# Patient Record
Sex: FEMALE | Race: WHITE | Employment: PART TIME | URBAN - METROPOLITAN AREA
[De-identification: names, ages, dates, MRNs, and addresses within clinical notes are randomized per-mention and may not be internally consistent; named-entity substitution may affect disease eponyms.]

---

## 2017-02-13 ENCOUNTER — OFFICE VISIT (OUTPATIENT)
Dept: FAMILY MEDICINE | Facility: CLINIC | Age: 32
End: 2017-02-13

## 2017-02-13 VITALS
TEMPERATURE: 98.4 F | OXYGEN SATURATION: 99 % | DIASTOLIC BLOOD PRESSURE: 60 MMHG | SYSTOLIC BLOOD PRESSURE: 118 MMHG | HEART RATE: 103 BPM | WEIGHT: 159 LBS | BODY MASS INDEX: 25.66 KG/M2

## 2017-02-13 DIAGNOSIS — R07.0 THROAT PAIN: Primary | ICD-10-CM

## 2017-02-13 DIAGNOSIS — J02.9 VIRAL PHARYNGITIS: ICD-10-CM

## 2017-02-13 LAB — S PYO AG THROAT QL IA.RAPID: NORMAL

## 2017-02-13 PROCEDURE — 87430 STREP A AG IA: CPT | Performed by: FAMILY MEDICINE

## 2017-02-13 PROCEDURE — 99212 OFFICE O/P EST SF 10 MIN: CPT | Performed by: FAMILY MEDICINE

## 2017-02-13 NOTE — MR AVS SNAPSHOT
After Visit Summary   2/13/2017    Tala Hurtado    MRN: [id number]           Patient Information     Date Of Birth          1985        Visit Information        Provider Department      2/13/2017 3:15 PM Daniel Watson MD [facility]        Today's Diagnoses     Throat pain    -  1    Viral pharyngitis           Follow-ups after your visit        Who to contact     If you have questions or need follow up information about today's clinic visit or your schedule please contact [facility] directly at [phone].  Normal or non-critical lab and imaging results will be communicated to you by [employer], letter or phone within 4 business days after the clinic has received the results. If you do not hear from us within 7 days, please contact the clinic through [employer] or phone. If you have a critical or abnormal lab result, we will notify you by phone as soon as possible.  Submit refill requests through [employer] or call your pharmacy and they will forward the refill request to us. Please allow 3 business days for your refill to be completed.          Additional Information About Your Visit        MyChart Information     [employer] gives you secure access to your electronic health record. If you see a primary care provider, you can also send messages to your care team and make appointments. If you have questions, please call your primary care clinic.  If you do not have a primary care provider, please call [phone] and they will assist you.        Care EveryWhere ID     This is your Care EveryWhere ID. This could be used by other organizations to access your [place] medical records  [id number]        Your Vitals Were     Pulse Temperature Last Period Pulse Oximetry Breastfeeding? BMI (Body Mass Index)    103 98.4  F (36.9  C) (Oral) 01/29/2017 99% No 25.66 kg/m2       Blood Pressure from Last 3 Encounters:   02/13/17 118/60   09/25/14 102/68   03/20/14 138/80    Weight  from Last 3 Encounters:   02/13/17 72.1 kg (159 lb)   09/25/14 66.6 kg (146 lb 12.8 oz)   03/20/14 64.5 kg (142 lb 3.2 oz)              We Performed the Following     Beta Strep Grp A Cult ([facility])     Rapid Strep ([facility])        Primary Care Provider Office Phone # Fax #    Daniel Watson [phone]       [facility] [address]  [facility] [address]        Thank you!     Thank you for choosing [facility]  for your care. Our goal is always to provide you with excellent care. Hearing back from our patients is one way we can continue to improve our services. Please take a few minutes to complete the written survey that you may receive in the mail after your visit with us. Thank you!             Your Updated Medication List - Protect others around you: Learn how to safely use, store and throw away your medicines at www.disposemymeds.org.          This list is accurate as of: 2/13/17 10:23 PM.  Always use your most recent med list.                   Brand Name Dispense Instructions for use    ADVIL 200 MG capsule   Generic drug:  ibuprofen      Take 200 mg by mouth as needed.       B-12 PO          LANTUS SC      Inject  Subcutaneous daily. 8 units every am       MICROGESTIN 1.5-30 MG-MCG per tablet   Generic drug:  norethindrone-ethinyl estradiol      Take 1 tablet by mouth daily.       NOVOLIN L SC      Inject Subcutaneous as needed Reported on 2/13/2017       propranolol 10 MG tablet    INDERAL     Take  by mouth as needed.       STATIN NOT PRESCRIBED (INTENTIONAL)     0 each    Statin not prescribed intentionally due to does not meet [place] criteria       VALTREX 500 MG tablet   Generic drug:  valACYclovir      Take 500 mg by mouth daily.

## 2017-02-14 NOTE — PROGRESS NOTES
"SUBJECTIVE:   Tala Hurtado  is a 31 year old  year old male presenting with a complaint of Sore throat.  Symptoms had a sudden onset  3 days ago .  The symptoms are moderate in severity.   Other Current and Associated symptoms: \"cold symptoms\".    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 8 units every am, Disp: , Rfl:      ibuprofen (ADVIL) 200 MG capsule, Take 200 mg by mouth as needed., Disp: , Rfl:      norethindrone-ethinyl estradiol (MICROGESTIN) 1.5-30 MG-MCG TABS, Take 1 tablet by mouth daily., Disp: , Rfl:      ValACYclovir (VALTREX) 500 MG tablet, Take 500 mg by mouth daily., Disp: , Rfl:      Insulin Zinc Human (NOVOLIN L SC), Inject Subcutaneous as needed Reported on 2/13/2017, Disp: , Rfl:     reports that she has never smoked. She has never used smokeless tobacco.      ROS:  C: NEGATIVE for fever, chills  ROS otherwise negative    OBJECTIVE  :/60  Wt 71.215 kg (157 lb)   APPEARANCE: = Nrl  Ears/Nose = Nrl  Ear canals and TM's = Nrl  Oropharynx = Nrl  Neck = Nrl  Resp effort = Nrl  Mood/Affect = Nrl     ASSESSMENT:  Sore Throat  Viral pharyngitis    PLAN:  See Orders in Epic.  The rapid strep test was NEGATIVE.  A back up strep culture is being done.  Symptomatic cares discussed - Gargle, use acetaminophen or other OTC analgesic  Daniel Watson       "

## 2017-02-15 LAB — BETA STREP GP A CULTURE: NEGATIVE

## 2017-02-17 NOTE — PROGRESS NOTES
Dear Tala,   I am writing to report that your included test results are within expected ranges. I do not suggest that we make any changes at this time.    Daniel Watson M.D.

## 2017-03-22 ENCOUNTER — TRANSFERRED RECORDS (OUTPATIENT)
Dept: FAMILY MEDICINE | Facility: CLINIC | Age: 32
End: 2017-03-22

## 2017-03-22 LAB
CHOLEST SERPL-MCNC: 153 MG/DL (ref 125–200)
CREAT SERPL-MCNC: 0.72 MG/DL (ref 0.5–1.1)
GFR SERPL CREATININE-BSD FRML MDRD: 109 ML/MIN/1.73M2
HBA1C MFR BLD: 6.1 % (ref 4–6)
HDLC SERPL-MCNC: 39 MG/DL
LDLC SERPL CALC-MCNC: 91 MG/DL
NONHDLC SERPL-MCNC: 114 MG/DL
TRIGL SERPL-MCNC: 115 MG/DL
TSH SERPL-ACNC: 1.79 UIU/ML (ref 0.3–5)

## 2017-05-08 ENCOUNTER — OFFICE VISIT (OUTPATIENT)
Dept: URGENT CARE | Facility: URGENT CARE | Age: 32
End: 2017-05-08
Payer: COMMERCIAL

## 2017-05-08 VITALS
OXYGEN SATURATION: 98 % | TEMPERATURE: 101 F | BODY MASS INDEX: 26.33 KG/M2 | HEART RATE: 122 BPM | SYSTOLIC BLOOD PRESSURE: 139 MMHG | WEIGHT: 163.1 LBS | DIASTOLIC BLOOD PRESSURE: 89 MMHG

## 2017-05-08 DIAGNOSIS — R52 BODY ACHES: ICD-10-CM

## 2017-05-08 DIAGNOSIS — N39.0 URINARY TRACT INFECTION WITHOUT HEMATURIA, SITE UNSPECIFIED: ICD-10-CM

## 2017-05-08 DIAGNOSIS — R50.9 FEVER AND CHILLS: Primary | ICD-10-CM

## 2017-05-08 DIAGNOSIS — R19.7 DIARRHEA, UNSPECIFIED TYPE: ICD-10-CM

## 2017-05-08 LAB
ALBUMIN UR-MCNC: NEGATIVE MG/DL
APPEARANCE UR: CLEAR
BACTERIA #/AREA URNS HPF: ABNORMAL /HPF
BILIRUB UR QL STRIP: NEGATIVE
COLOR UR AUTO: YELLOW
DEPRECATED S PYO AG THROAT QL EIA: NORMAL
FLUAV+FLUBV AG SPEC QL: NEGATIVE
FLUAV+FLUBV AG SPEC QL: NORMAL
GLUCOSE UR STRIP-MCNC: NEGATIVE MG/DL
HGB UR QL STRIP: NEGATIVE
KETONES UR STRIP-MCNC: NEGATIVE MG/DL
LEUKOCYTE ESTERASE UR QL STRIP: ABNORMAL
MICRO REPORT STATUS: NORMAL
NITRATE UR QL: NEGATIVE
NON-SQ EPI CELLS #/AREA URNS LPF: ABNORMAL /LPF
PH UR STRIP: 6 PH (ref 5–7)
RBC #/AREA URNS AUTO: ABNORMAL /HPF (ref 0–2)
SP GR UR STRIP: 1.02 (ref 1–1.03)
SPECIMEN SOURCE: NORMAL
SPECIMEN SOURCE: NORMAL
URN SPEC COLLECT METH UR: ABNORMAL
UROBILINOGEN UR STRIP-ACNC: 0.2 EU/DL (ref 0.2–1)
WBC #/AREA URNS AUTO: ABNORMAL /HPF (ref 0–2)

## 2017-05-08 PROCEDURE — 87804 INFLUENZA ASSAY W/OPTIC: CPT | Performed by: FAMILY MEDICINE

## 2017-05-08 PROCEDURE — 81001 URINALYSIS AUTO W/SCOPE: CPT | Performed by: FAMILY MEDICINE

## 2017-05-08 PROCEDURE — 87081 CULTURE SCREEN ONLY: CPT | Mod: 59 | Performed by: FAMILY MEDICINE

## 2017-05-08 PROCEDURE — 87086 URINE CULTURE/COLONY COUNT: CPT | Performed by: FAMILY MEDICINE

## 2017-05-08 PROCEDURE — 87880 STREP A ASSAY W/OPTIC: CPT | Performed by: FAMILY MEDICINE

## 2017-05-08 PROCEDURE — 99213 OFFICE O/P EST LOW 20 MIN: CPT | Performed by: FAMILY MEDICINE

## 2017-05-08 RX ORDER — NITROFURANTOIN 25; 75 MG/1; MG/1
100 CAPSULE ORAL 2 TIMES DAILY
Qty: 14 CAPSULE | Refills: 0 | Status: SHIPPED | OUTPATIENT
Start: 2017-05-08 | End: 2017-05-10

## 2017-05-08 RX ORDER — NORETHINDRONE ACETATE AND ETHINYL ESTRADIOL 1.5-30(21)
KIT ORAL
Refills: 2 | COMMUNITY
Start: 2016-11-22

## 2017-05-08 NOTE — NURSING NOTE
"Chief Complaint   Patient presents with     Fever     fever x today,  joint pain x yesterday,  abdominal pain x 5 days on and off       Initial /89 (BP Location: Left arm, Patient Position: Chair, Cuff Size: Adult Regular)  Pulse 122  Temp 101  F (38.3  C) (Oral)  Wt 163 lb 1.6 oz (74 kg)  SpO2 98%  BMI 26.33 kg/m2 Estimated body mass index is 26.33 kg/(m^2) as calculated from the following:    Height as of 9/25/14: 5' 6\" (1.676 m).    Weight as of this encounter: 163 lb 1.6 oz (74 kg).  Medication Reconciliation: complete    "

## 2017-05-08 NOTE — MR AVS SNAPSHOT
After Visit Summary   5/8/2017    Tala Hurtado    MRN: [id number]           Patient Information     Date Of Birth          1985        Visit Information        Provider Department      5/8/2017 6:30 PM Karla Mansfield MD [facility]        Today's Diagnoses     Fever and chills    -  1    Diarrhea, unspecified type        Body aches        Urinary tract infection without hematuria, site unspecified           Follow-ups after your visit        Who to contact     If you have questions or need follow up information about today's clinic visit or your schedule please contact [facility] directly at [phone].  Normal or non-critical lab and imaging results will be communicated to you by MyChart, letter or phone within 4 business days after the clinic has received the results. If you do not hear from us within 7 days, please contact the clinic through [employer] or phone. If you have a critical or abnormal lab result, we will notify you by phone as soon as possible.  Submit refill requests through [employer] or call your pharmacy and they will forward the refill request to us. Please allow 3 business days for your refill to be completed.          Additional Information About Your Visit        MyChart Information     [employer] gives you secure access to your electronic health record. If you see a primary care provider, you can also send messages to your care team and make appointments. If you have questions, please call your primary care clinic.  If you do not have a primary care provider, please call [phone] and they will assist you.        Care EveryWhere ID     This is your Care EveryWhere ID. This could be used by other organizations to access your [place] medical records  [id number]        Your Vitals Were     Pulse Temperature Pulse Oximetry BMI (Body Mass Index)          122 101  F (38.3  C) (Oral) 98% 26.33 kg/m2         Blood  Pressure from Last 3 Encounters:   05/08/17 139/89   02/13/17 118/60   09/25/14 102/68    Weight from Last 3 Encounters:   05/08/17 163 lb 1.6 oz (74 kg)   02/13/17 159 lb (72.1 kg)   09/25/14 146 lb 12.8 oz (66.6 kg)              We Performed the Following     Influenza A/B antigen     UA with Microscopic     Urine Culture Aerobic Bacterial          Today's Medication Changes          These changes are accurate as of: 5/8/17  7:31 PM.  If you have any questions, ask your nurse or doctor.               Start taking these medicines.        Dose/Directions    nitrofurantoin (macrocrystal-monohydrate) 100 MG capsule   Commonly known as:  MACROBID   Used for:  Urinary tract infection without hematuria, site unspecified   Started by:  Karla Mansfield MD        Dose:  100 mg   Take 1 capsule (100 mg) by mouth 2 times daily   Quantity:  14 capsule   Refills:  0            Where to get your medicines      These medications were sent to [address] [address]     Phone:  [phone]     nitrofurantoin (macrocrystal-monohydrate) 100 MG capsule                Primary Care Provider Office Phone # Fax #    Daniel Watson [phone]       [facility] [address]  [facility] [address]        Thank you!     Thank you for choosing [facility]  for your care. Our goal is always to provide you with excellent care. Hearing back from our patients is one way we can continue to improve our services. Please take a few minutes to complete the written survey that you may receive in the mail after your visit with us. Thank you!             Your Updated Medication List - Protect others around you: Learn how to safely use, store and throw away your medicines at www.disposemymeds.org.          This list is accurate as of: 5/8/17  7:31 PM.  Always use your most recent med list.                    Brand Name Dispense Instructions for use    ADVIL 200 MG capsule   Generic drug:  ibuprofen      Take 200 mg by mouth as needed.       B-12 PO          BLISOVI FE 1.5/30 1.5-30 MG-MCG per tablet   Generic drug:  norethindrone-ethinyl estradiol-iron      TAKE 1 TABLET BY MOUTH ONCE DAILY.       LANTUS SC      Inject  Subcutaneous daily. 8 units every am       MICROGESTIN 1.5-30 MG-MCG per tablet   Generic drug:  norethindrone-ethinyl estradiol      Take 1 tablet by mouth daily Reported on 5/8/2017       nitrofurantoin (macrocrystal-monohydrate) 100 MG capsule    MACROBID    14 capsule    Take 1 capsule (100 mg) by mouth 2 times daily       NOVOLIN L SC      Inject Subcutaneous as needed Reported on 2/13/2017       propranolol 10 MG tablet    INDERAL     Take  by mouth as needed.       STATIN NOT PRESCRIBED (INTENTIONAL)     0 each    Statin not prescribed intentionally due to does not meet [place] criteria       VALTREX 500 MG tablet   Generic drug:  valACYclovir      Take 500 mg by mouth daily.

## 2017-05-09 LAB
BACTERIA SPEC CULT: NORMAL
MICRO REPORT STATUS: NORMAL
SPECIMEN SOURCE: NORMAL

## 2017-05-09 NOTE — NURSING NOTE
The following medication was given:     MEDICATION: Tylenol 325mg  ROUTE: PO  SITE: mouth  DOSE: 620mg  LOT #: [id number]  Time: 7:15PM  :  Major   EXPIRATION DATE:  12/17  NDC#: 4021-9671-22  Caryn Lee MA

## 2017-05-09 NOTE — PROGRESS NOTES
SUBJECTIVE:  Tala Hurtado, a 32 year old female scheduled an appointment to discuss the following issues:     Fever and chills  Diarrhea, unspecified type  Body aches  Urinary tract infection without hematuria, site unspecified     She presents with body ache generalized along with fever for a day   And also diarrhea for a day , denies any blood in the stool and also denies any nausea or vomiting   She has been noticing a lot of stomach cramps today.she denies any uti symptoms   Has no Uri symptoms now     Past Medical History:   Diagnosis Date     Acne      Anxiety      Diabetes mellitus (H)      Ehler Danlos syndrome      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     Social History Main Topics     Smoking status: Never Smoker     Smokeless tobacco: Never Used     Alcohol use 0.5 oz/week     1 drink(s) per week     Drug use: Not on file     Sexual activity: Not on file     Other Topics Concern     Not on file     Social History Narrative        Current Outpatient Prescriptions   Medication Sig Dispense Refill     BLISOVI FE 1.5/30 1.5-30 MG-MCG per tablet TAKE 1 TABLET BY MOUTH ONCE DAILY.  2     nitrofurantoin, macrocrystal-monohydrate, (MACROBID) 100 MG capsule Take 1 capsule (100 mg) by mouth 2 times daily 14 capsule 0     Cyanocobalamin (B-12 PO)        Insulin Glargine (LANTUS SC) Inject  Subcutaneous daily. 8 units every am       ibuprofen (ADVIL) 200 MG capsule Take 200 mg by mouth as needed.       ValACYclovir (VALTREX) 500 MG tablet Take 500 mg by mouth daily.       STATIN NOT PRESCRIBED, INTENTIONAL, Statin not prescribed  intentionally due to does not meet [place] criteria 0 each 0     propranolol (INDERAL) 10 MG tablet Take  by mouth as needed.       Insulin Zinc Human (NOVOLIN L SC) Inject Subcutaneous as needed Reported on 2/13/2017       norethindrone-ethinyl estradiol (MICROGESTIN) 1.5-30 MG-MCG TABS Take 1 tablet by mouth daily Reported on 5/8/2017         Health Maintenance   Topic Date Due     FOOT EXAM Q1 YEAR( NO INBASKET)  02/17/1984     PNEUMOVAX 1X HI RISK PATIENT < 65 (NO IB MSG)  02/17/1985     PAP SCREENING Q3 YR (SYSTEM ASSIGNED)  02/17/2004     EYE EXAM Q1 YEAR( NO INBASKET)  05/17/2017     INFLUENZA VACCINE (SYSTEM ASSIGNED)  09/01/2017     A1C Q6 MO( NO INBASKET)  09/22/2017     CREATININE Q1 YEAR (NO INBASKET)  03/22/2018     LIPID MONITORING Q1 YEAR( NO INBASKET)  03/22/2018     MICROALBUMIN Q1 YEAR( NO INBASKET)  03/22/2018     TSH W/ FREE T4 REFLEX Q2 YEAR (NO INBASKET)  03/22/2019     TETANUS IMMUNIZATION (SYSTEM ASSIGNED)  02/13/2027        ROS:  CONSTITUTIONAL:positive for  fever, no chills or sweats, no excessive fatigue, no significant change in weight  CV: neg   RESP -neg  GI:  Neg   NEURO: neg   MSK - neg   Skin - neg   Pyschiatry-neg     OBJECTIVE:  /89 (BP Location: Left arm, Patient Position: Chair, Cuff Size: Adult Regular)  Pulse 122  Temp 101  F (38.3  C) (Oral)  Wt 163 lb 1.6 oz (74 kg)  SpO2 98%  BMI 26.33 kg/m2      EXAM:  GENERAL APPEARANCE: healthy, alert and no distress  EYES: EOMI,  PERRL  HENT: ear canals and TM's normal and nose and mouth without ulcers or lesions  RESP: lungs clear to auscultation - no rales, rhonchi or wheezes  CV: regular rates and rhythm, normal S1 S2, no S3 or S4 and no murmur, click or rub -  ABDOMEN:  soft, nontender, no HSM or masses and bowel sounds normal  SKIN: no suspicious lesions or rashes  PSYCH: mentation appears normal and affect normal/bright    Results for orders placed or performed in visit on 05/08/17   UA with Microscopic   Result Value  Ref Range    Color Urine Yellow     Appearance Urine Clear     Glucose Urine Negative NEG mg/dL    Bilirubin Urine Negative NEG    Ketones Urine Negative NEG mg/dL    Specific Gravity Urine 1.020 1.003 - 1.035    pH Urine 6.0 5.0 - 7.0 pH    Protein Albumin Urine Negative NEG mg/dL    Urobilinogen Urine 0.2 0.2 - 1.0 EU/dL    Nitrite Urine Negative NEG    Blood Urine Negative NEG    Leukocyte Esterase Urine Trace (A) NEG    Source Midstream Urine     WBC Urine 2-5 (A) 0 - 2 /HPF    RBC Urine O - 2 0 - 2 /HPF    Squamous Epithelial /LPF Urine Few FEW /LPF    Bacteria Urine Moderate (A) NEG /HPF   Influenza A/B antigen   Result Value Ref Range    Influenza A/B Agn Specimen Nasal     Influenza A Negative NEG    Influenza B  NEG     Negative   Test results must be correlated with clinical data. If necessary, results   should be confirmed by a molecular assay or viral culture.     Strep, Rapid Screen   Result Value Ref Range    Specimen Description Throat     Rapid Strep A Screen       NEGATIVE: No Group A streptococcal antigen detected by immunoassay, await   culture report.      Micro Report Status FINAL 05/08/2017          ASSESSMENT/PLAN:  Tala was seen today for fever.    Diagnoses and all orders for this visit:    Fever and chills  -     Influenza A/B antigen  -     UA with Microscopic  -     Strep, Rapid Screen    Diarrhea, unspecified type    Body aches  -     Strep, Rapid Screen    Urinary tract infection without hematuria, site unspecified  -     nitrofurantoin, macrocrystal-monohydrate, (MACROBID) 100 MG capsule; Take 1 capsule (100 mg) by mouth 2 times daily  -     Urine Culture Aerobic Bacterial    reviewed lab results with pt   Suggested to follow up if symptoms worsen           Follow up if  symptoms fail to improve or worsens   Pt understood and agreed with plan             Karla Mansfield MD

## 2017-05-10 ENCOUNTER — HOSPITAL ENCOUNTER (EMERGENCY)
Facility: CLINIC | Age: 32
Discharge: HOME | End: 2017-05-10
Attending: EMERGENCY MEDICINE | Admitting: EMERGENCY MEDICINE
Payer: COMMERCIAL

## 2017-05-10 ENCOUNTER — APPOINTMENT (OUTPATIENT)
Dept: CT IMAGING | Facility: CLINIC | Age: 32
End: 2017-05-10
Attending: EMERGENCY MEDICINE
Payer: COMMERCIAL

## 2017-05-10 ENCOUNTER — APPOINTMENT (OUTPATIENT)
Dept: ULTRASOUND IMAGING | Facility: CLINIC | Age: 32
End: 2017-05-10
Attending: EMERGENCY MEDICINE
Payer: COMMERCIAL

## 2017-05-10 VITALS
BODY MASS INDEX: 25.27 KG/M2 | RESPIRATION RATE: 18 BRPM | HEIGHT: 67 IN | HEART RATE: 104 BPM | OXYGEN SATURATION: 99 % | SYSTOLIC BLOOD PRESSURE: 122 MMHG | DIASTOLIC BLOOD PRESSURE: 81 MMHG | WEIGHT: 161 LBS | TEMPERATURE: 98.8 F

## 2017-05-10 DIAGNOSIS — K52.9 COLITIS: ICD-10-CM

## 2017-05-10 DIAGNOSIS — R10.13 ABDOMINAL PAIN, EPIGASTRIC: ICD-10-CM

## 2017-05-10 LAB
ALBUMIN SERPL-MCNC: 3.7 G/DL (ref 3.4–5)
ALBUMIN UR-MCNC: NEGATIVE MG/DL
ALP SERPL-CCNC: 46 U/L (ref 40–150)
ALT SERPL W P-5'-P-CCNC: 26 U/L (ref 0–50)
ANION GAP SERPL CALCULATED.3IONS-SCNC: 9 MMOL/L (ref 3–14)
APPEARANCE UR: CLEAR
AST SERPL W P-5'-P-CCNC: 22 U/L (ref 0–45)
BACTERIA #/AREA URNS HPF: ABNORMAL /HPF
BACTERIA SPEC CULT: NO GROWTH
BASOPHILS # BLD AUTO: 0 10E9/L (ref 0–0.2)
BASOPHILS NFR BLD AUTO: 0.6 %
BILIRUB SERPL-MCNC: 0.3 MG/DL (ref 0.2–1.3)
BILIRUB UR QL STRIP: NEGATIVE
BUN SERPL-MCNC: 8 MG/DL (ref 7–30)
CALCIUM SERPL-MCNC: 8.1 MG/DL (ref 8.5–10.1)
CHLORIDE SERPL-SCNC: 105 MMOL/L (ref 94–109)
CO2 SERPL-SCNC: 24 MMOL/L (ref 20–32)
COLOR UR AUTO: ABNORMAL
CREAT SERPL-MCNC: 0.71 MG/DL (ref 0.52–1.04)
DIFFERENTIAL METHOD BLD: ABNORMAL
EOSINOPHIL # BLD AUTO: 0 10E9/L (ref 0–0.7)
EOSINOPHIL NFR BLD AUTO: 0.6 %
ERYTHROCYTE [DISTWIDTH] IN BLOOD BY AUTOMATED COUNT: 12.5 % (ref 10–15)
GFR SERPL CREATININE-BSD FRML MDRD: ABNORMAL ML/MIN/1.7M2
GLUCOSE SERPL-MCNC: 141 MG/DL (ref 70–99)
GLUCOSE UR STRIP-MCNC: NEGATIVE MG/DL
HCG UR QL: NEGATIVE
HCT VFR BLD AUTO: 42.9 % (ref 35–47)
HGB BLD-MCNC: 14.9 G/DL (ref 11.7–15.7)
HGB UR QL STRIP: NEGATIVE
IMM GRANULOCYTES # BLD: 0 10E9/L (ref 0–0.4)
IMM GRANULOCYTES NFR BLD: 0.3 %
KETONES UR STRIP-MCNC: NEGATIVE MG/DL
LACTATE SERPL-SCNC: 0.8 MMOL/L (ref 0.4–2)
LEUKOCYTE ESTERASE UR QL STRIP: NEGATIVE
LIPASE SERPL-CCNC: 97 U/L (ref 73–393)
LYMPHOCYTES # BLD AUTO: 0.8 10E9/L (ref 0.8–5.3)
LYMPHOCYTES NFR BLD AUTO: 23.9 %
MCH RBC QN AUTO: 29.5 PG (ref 26.5–33)
MCHC RBC AUTO-ENTMCNC: 34.7 G/DL (ref 31.5–36.5)
MCV RBC AUTO: 85 FL (ref 78–100)
MICRO REPORT STATUS: NORMAL
MONOCYTES # BLD AUTO: 0.3 10E9/L (ref 0–1.3)
MONOCYTES NFR BLD AUTO: 7.9 %
MUCOUS THREADS #/AREA URNS LPF: PRESENT /LPF
NEUTROPHILS # BLD AUTO: 2.2 10E9/L (ref 1.6–8.3)
NEUTROPHILS NFR BLD AUTO: 66.7 %
NITRATE UR QL: NEGATIVE
PH UR STRIP: 6 PH (ref 5–7)
PLATELET # BLD AUTO: 126 10E9/L (ref 150–450)
POTASSIUM SERPL-SCNC: 3.7 MMOL/L (ref 3.4–5.3)
PROT SERPL-MCNC: 7.2 G/DL (ref 6.8–8.8)
RBC # BLD AUTO: 5.05 10E12/L (ref 3.8–5.2)
RBC #/AREA URNS AUTO: 0 /HPF (ref 0–2)
SODIUM SERPL-SCNC: 138 MMOL/L (ref 133–144)
SP GR UR STRIP: 1 (ref 1–1.03)
SPECIMEN SOURCE: NORMAL
SQUAMOUS #/AREA URNS AUTO: <1 /HPF (ref 0–1)
URN SPEC COLLECT METH UR: ABNORMAL
UROBILINOGEN UR STRIP-MCNC: NORMAL MG/DL (ref 0–2)
WBC # BLD AUTO: 3.3 10E9/L (ref 4–11)
WBC #/AREA URNS AUTO: <1 /HPF (ref 0–2)

## 2017-05-10 PROCEDURE — 96374 THER/PROPH/DIAG INJ IV PUSH: CPT | Mod: 59

## 2017-05-10 PROCEDURE — 85025 COMPLETE CBC W/AUTO DIFF WBC: CPT | Performed by: EMERGENCY MEDICINE

## 2017-05-10 PROCEDURE — 87040 BLOOD CULTURE FOR BACTERIA: CPT | Performed by: EMERGENCY MEDICINE

## 2017-05-10 PROCEDURE — 81025 URINE PREGNANCY TEST: CPT | Performed by: EMERGENCY MEDICINE

## 2017-05-10 PROCEDURE — 25000128 H RX IP 250 OP 636: Performed by: EMERGENCY MEDICINE

## 2017-05-10 PROCEDURE — 96361 HYDRATE IV INFUSION ADD-ON: CPT

## 2017-05-10 PROCEDURE — 80053 COMPREHEN METABOLIC PANEL: CPT | Performed by: EMERGENCY MEDICINE

## 2017-05-10 PROCEDURE — 25000125 ZZHC RX 250: Performed by: EMERGENCY MEDICINE

## 2017-05-10 PROCEDURE — 83605 ASSAY OF LACTIC ACID: CPT | Performed by: EMERGENCY MEDICINE

## 2017-05-10 PROCEDURE — 83690 ASSAY OF LIPASE: CPT | Performed by: EMERGENCY MEDICINE

## 2017-05-10 PROCEDURE — 25000132 ZZH RX MED GY IP 250 OP 250 PS 637: Performed by: EMERGENCY MEDICINE

## 2017-05-10 PROCEDURE — 76705 ECHO EXAM OF ABDOMEN: CPT

## 2017-05-10 PROCEDURE — 74177 CT ABD & PELVIS W/CONTRAST: CPT

## 2017-05-10 PROCEDURE — 99285 EMERGENCY DEPT VISIT HI MDM: CPT | Mod: 25

## 2017-05-10 PROCEDURE — 25500064 ZZH RX 255 OP 636: Performed by: EMERGENCY MEDICINE

## 2017-05-10 PROCEDURE — 81001 URINALYSIS AUTO W/SCOPE: CPT | Performed by: EMERGENCY MEDICINE

## 2017-05-10 PROCEDURE — 93005 ELECTROCARDIOGRAM TRACING: CPT

## 2017-05-10 PROCEDURE — 96375 TX/PRO/DX INJ NEW DRUG ADDON: CPT

## 2017-05-10 RX ORDER — MORPHINE SULFATE 4 MG/ML
4 INJECTION, SOLUTION INTRAMUSCULAR; INTRAVENOUS
Status: DISCONTINUED | OUTPATIENT
Start: 2017-05-10 | End: 2017-05-10 | Stop reason: HOSPADM

## 2017-05-10 RX ORDER — ONDANSETRON 2 MG/ML
4 INJECTION INTRAMUSCULAR; INTRAVENOUS ONCE
Status: COMPLETED | OUTPATIENT
Start: 2017-05-10 | End: 2017-05-10

## 2017-05-10 RX ORDER — CIPROFLOXACIN 500 MG/1
500 TABLET, FILM COATED ORAL 2 TIMES DAILY
Qty: 14 TABLET | Refills: 0 | Status: SHIPPED | OUTPATIENT
Start: 2017-05-10 | End: 2017-05-17

## 2017-05-10 RX ORDER — IOPAMIDOL 755 MG/ML
81 INJECTION, SOLUTION INTRAVASCULAR ONCE
Status: COMPLETED | OUTPATIENT
Start: 2017-05-10 | End: 2017-05-10

## 2017-05-10 RX ORDER — CIPROFLOXACIN 500 MG/1
500 TABLET, FILM COATED ORAL ONCE
Status: COMPLETED | OUTPATIENT
Start: 2017-05-10 | End: 2017-05-10

## 2017-05-10 RX ADMIN — SODIUM CHLORIDE 65 ML: 9 INJECTION, SOLUTION INTRAVENOUS at 04:39

## 2017-05-10 RX ADMIN — SODIUM CHLORIDE 1000 ML: 9 INJECTION, SOLUTION INTRAVENOUS at 03:03

## 2017-05-10 RX ADMIN — CIPROFLOXACIN HYDROCHLORIDE 500 MG: 500 TABLET, FILM COATED ORAL at 05:57

## 2017-05-10 RX ADMIN — MORPHINE SULFATE 4 MG: 4 INJECTION, SOLUTION INTRAMUSCULAR; INTRAVENOUS at 03:07

## 2017-05-10 RX ADMIN — ONDANSETRON 4 MG: 2 SOLUTION INTRAMUSCULAR; INTRAVENOUS at 03:04

## 2017-05-10 RX ADMIN — SODIUM CHLORIDE 1000 ML: 9 INJECTION, SOLUTION INTRAVENOUS at 01:09

## 2017-05-10 RX ADMIN — IOPAMIDOL 81 ML: 755 INJECTION, SOLUTION INTRAVENOUS at 04:39

## 2017-05-10 ASSESSMENT — ENCOUNTER SYMPTOMS
VOMITING: 0
DYSURIA: 0
ABDOMINAL PAIN: 1
FEVER: 1
CHILLS: 1
DIARRHEA: 1
MYALGIAS: 1
ARTHRALGIAS: 1
NAUSEA: 1

## 2017-05-10 NOTE — ED AVS SNAPSHOT
Emergency Department    [address] [place] [address]    Phone:  [phone]    Fax:  [phone]                                       Tala Hurtado   MRN: [id number]    Department:   Emergency Department   Date of Visit:  5/10/2017           After Visit Summary Signature Page     I have received my discharge instructions, and my questions have been answered. I have discussed any challenges I see with this plan with the nurse or doctor.    ..........................................................................................................................................  Patient/Patient Representative Signature      ..........................................................................................................................................  Patient Representative Print Name and Relationship to Patient    ..................................................               ................................................  Date                                            Time    ..........................................................................................................................................  Reviewed by Signature/Title    ...................................................              ..............................................  Date                                                            Time

## 2017-05-10 NOTE — ED PROVIDER NOTES
History     Chief Complaint:  Abdominal Pain     HPI   Tala Hurtado is a 32 year old female with a history of diabetes and Von Willebrand disease who presents to the emergency department today for evaluation of abdominal pain. The patient has had intermittent abdominal pain for the past few days. Yesterday she developed a fever, chills, sweats and generalized arthralgias and myalgias. She was seen at Urgent Care yesterday and told she had a bladder infection which surprised the patient as she had no dysuria, burning or pressure with urination and her symptoms do not feel like her prior UTI's. She was started on Macrobid and sent home. Since that time she reports that she has had intense upper abdominal pain. This evening she had 8-9 episodes of greenish liquid diarrhea within 2 hours. She has felt nauseous but has not vomited. Today her fever has been intermittent. Her only prior abdominal surgery was an appendectomy. Her last period was two weeks ago. She did have some lower back pain yesterday but none today. She states she drinks alcohol rarely. She does not use Ibuprofen or Aspirin frequently. She has had no frequent antibiotic use before yesterday. 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     Allergies:  Cephalosporins - anaphylaxis  Tetanus Toxoids - anaphylaxis   Aspirin      Medications:    Macrobid  Propranolol   Insulin   Microgestin   Valtrex    Past Medical History:    Acne  Anxiety   Diabetes mellitus  Ehler Danlos syndrome  Latent autoimmune diabetes in adults   Meningitis, viral   Von Willebrand disease     Past Surgical History:    Appendectomy     Family History:     History reviewed. No pertinent family history.     Social History:  The patient was accompanied to the ED by a friend.  Smoking Status: Negative  Smokeless Tobacco: Negative  Alcohol Use: Positive  Marital Status:  Single [1]     Review of Systems  "  Constitutional: Positive for chills and fever.   Gastrointestinal: Positive for abdominal pain, diarrhea and nausea. Negative for vomiting.   Genitourinary: Negative for dysuria.   Musculoskeletal: Positive for arthralgias and myalgias.   All other systems reviewed and are negative.    Physical Exam   Vitals:   Patient Vitals for the past 24 hrs:   BP Temp Temp src Heart Rate SpO2 Height Weight   05/10/17 0500 124/82 - - - 95 % - -   05/10/17 0430 - - - - 95 % - -   05/10/17 0404 - - - - 100 % - -   05/10/17 0356 127/80 - - - - - -   05/10/17 0300 120/83 - - - 97 % - -   05/10/17 0007 (!) 147/101 98.8  F (37.1  C) Oral 133 90 % 1.702 m (5' 7\") 73 kg (161 lb)     Physical Exam  Gen: Pleasant, appears stated age.  In pain.    Eye:   Pupils are equal, round, and reactive.     Sclera non-injected.    ENT:   Moist mucus membranes.     Normal tongue.    Oropharynx without lesions.    Cardiac:     Tachycardic rate and regular rhythm.    No murmurs, gallops, or rubs.    Pulmonary:     Clear to auscultation bilaterally.    No wheezes, rales, or rhonchi.    Abdomen:     Normal active bowel sounds.     Abdomen is soft and non-distended, with mild epigastric abdominal tenderness.   Negative Louise sign.    Musculoskeletal:     Normal movement of all extremities without evidence for deficit.    Extremities:    No edema.    Skin:   Warm and dry.    Neurologic:    Non-focal exam without asymmetric weakness or numbness.    Normal tone    Psychiatric:     Normal affect with appropriate interaction with examiner.    Emergency Department Course     Imaging:  Radiology findings were communicated with the patient who voiced understanding of the findings.    Abdomen US, limited (RUQ only):   IMPRESSION:  1. Unremarkable appearance of the gallbladder and liver.  2. No biliary dilatation.  Reading per radiology    Abd/pelvis CT, IV contrast only, trauma/AAA:  IMPRESSION:  1. Apparent mild wall thickening of the transverse colon, " equivocal  for infectious or inflammatory colitis.  2. A trace amount of nonspecific free fluid in the pelvis  3. No other cause of acute pain identified in the abdomen or pelvis.  Reading per radiology    Laboratory:  Laboratory findings were communicated with the patient who voiced understanding of the findings.    CBC: WBC 3.3 (L), HGB 14.9,  (L)   CMP: Glucose 141 (H), Calcium 8.1 (L) (Creatinine: 0.71)  Lipase: 97  Lactic acid: 0.8  Blood culture one site: Pending    UA with Microscopic: Specific Gravity Urine 1.002 (L), Bacteria: Few (A), Mucous Urine: Present (A)  HCG qualitative urine: Negative    Interventions:  0109 ns 1000 mL IV  0303 ns 1000 mL IV  0304 Zofran 4 mg IV  0307 morphine 4 mg IV     Emergency Department Course:  Nursing notes and vitals reviewed.  I performed an exam of the patient as documented above.   IV was inserted and blood was drawn for laboratory testing, results above.  The patient was sent for an ultrasound while in the emergency department, results above.   The patient provided a urine sample here in the emergency department. This was sent for laboratory testing, findings above.  At 0545 the patient was rechecked and was updated on the results of her laboratory and imaging studies. She is feeling better.   I discussed the treatment plan with the patient. They expressed understanding of this plan and consented to discharge. They will be discharged home with instructions for care and follow up. In addition, the patient will return to the emergency department if their symptoms persist, worsen, if new symptoms arise or if there is any concern.  All questions were answered.  I personally reviewed the laboratory and imaging results with the patient and answered all related questions prior to discharge.    Impression & Plan      Medical Decision Making:  Tala Hurtado is a 32 year old female with a history of diabetes who presents today with severe epigastric abdominal pain,  intermittent fevers and chills and watery diarrhea. On exam, the patient is moderately ill appearing. Workup is notable for slightly elevated blood glucose of 140 and slightly low white blood cell count and platelet count. It appears that the patient has history of this in the past. Lactate is within normal limits. Right upper quadrant ultrasound is negative for any cholecystitis or cholelithiasis. CT of the abdomen demonstrates transverse colitis. Given the diarrhea and chills I suspect an infectious cause. She does not have risk factors for C diff or campylobacter. At this point, she would like to start antibiotics. We discussed the side effects associated with this including tendonitis or tendon rupture, although those are rare. Given that this can shorten the duration of her symptoms she would like to pursue that at this time. She declines any other pain medication. She was unable to provide us with a stool sample in the ED. I've recommended close follow up with PCP. Otherwise return to the ED for increasing pain, persistent fevers, bloody diarrhea or for any other concerning symptoms.     Diagnosis:    ICD-10-CM    1. Abdominal pain, epigastric R10.13    2. Colitis K52.9      Disposition:   Discharge to home    Discharge Medications:  New Prescriptions    CIPROFLOXACIN (CIPRO) 500 MG TABLET    Take 1 tablet (500 mg) by mouth 2 times daily for 7 days     Scribe Disclosure:  Treva YANCEY, am serving as a scribe at 1:19 AM on 5/10/2017 to document services personally performed by Fanny Chong MD, based on my observations and the provider's statements to me.    5/10/2017    EMERGENCY DEPARTMENT       Fanny Chong MD  05/10/17 [id number]

## 2017-05-10 NOTE — ED AVS SNAPSHOT
Emergency Department    [address] [facility] [address]    Phone:  [phone]    Fax:  [phone]                                       Tala Hurtado   MRN: [id number]    Department:   Emergency Department   Date of Visit:  5/10/2017           Patient Information     Date Of Birth          1985        Your diagnoses for this visit were:     Abdominal pain, epigastric     Colitis        You were seen by Fanny Chong MD.      Follow-up Information     Follow up with  Emergency Department.    Specialty:  EMERGENCY MEDICINE    Why:  immediately , If symptoms worsen    Contact information:    [address] [facility] [address] [phone]        Follow up with Daniel Watson MD In 3 days.    Specialty:  Family Practice    Why:  for a recheck of your symptoms    Contact information:    [facility]  [address]  [facility] [address] [phone]          Discharge Instructions       Discharge Instructions  Abdominal Pain    Abdominal pain can be caused by many things. Your evaluation today does not show the exact cause for your pain. Your doctor today has decided that it is unlikely your pain is due to a life threatening problem, or a problem requiring surgery or hospital admission. Sometimes those problems cannot be found right away, so it is very important that you follow up as directed.  Sometimes only the changes which occur over time allow the cause of your pain to be found.    Return to the Emergency Department for a recheck in 8-12 hours if your pain continues.  If your pain gets worse, changes in location, or feels different, return to the Emergency Department right away.    ADULTS:  Return to the Emergency Department right away if:      You get an oral temperature above 102oF or as directed by your doctor.    You have blood in your stools (bright red or black, tarry stools).    You keep throwing up or can t drink  liquids.    You see blood when you throw up.    You can t have a bowel movement or you can t pass gas.    Your stomach gets bloated or bigger.    Your skin or the whites of your eyes look yellow.    You faint.    You have bloody, frequent or painful urination.    You have new symptoms or anything that worries you.    CHILDREN:  Return to the Emergency Department right away if your child has any of the above-listed symptoms or the following:      Pushes your hand away or screams/cries when his/her belly is touched.    You notice your child is very fussy or weak.    Your child is very tired and is too tired to eat or drink.    Your child is dehydrated.  Signs of dehydration can be:  o Your infant has had no wet diapers in 4-5 hours.  o Your older child has not passed urine in 6-8 hours.  o Your infant or child starts to have dry mouth and lips, or no saliva or tears.    PREGNANT WOMEN:  Return to the Emergency Department right away if you have any of the above-listed symptoms or the following:      You have bleeding, leaking fluid or passing tissue from the vagina.    You have worse pain or cramping, or pain in your shoulder or back.    You have vomiting that will not stop.    You have painful or bloody urination.    You have a temperature of 100oF or more.    Your baby is not moving as much as usual.    You faint.    You get a bad headache with or without eye problems and abdominal pain.    You have a convulsion or seizure.    You have unusual discharge from your vagina and abdominal pain.    Abdominal pain is pretty common during pregnancy.  Your pain may or may not be related to your pregnancy. You should follow-up closely with your OB doctor so they can evaluate you and your baby.  Until you follow-up with your regular doctor, do the following:       Avoid sex and do not put anything in your vagina.    Drink clear fluids.    Only take medications approved by your doctor.    MORE INFORMATION:    Appendicitis:  A  "possible cause of abdominal pain in any person who still has their appendix is acute appendicitis. Appendicitis is often hard to diagnose.  Testing does not always rule out early appendicitis or other causes of abdominal pain. Close follow-up with your doctor and re-evaluations may be needed to figure out the reason for your abdominal pain.    Follow-up:  It is very important that you make an appointment with your clinic and go to the appointment.  If you do not follow-up with your primary doctor, it may result in missing an important development which could result in permanent injury or disability and/or lasting pain.  If there is any problem keeping your appointment, call your doctor or return to the Emergency Department.    Medications:  Take your medications as directed by your doctor today.  Before using over-the-counter medications, ask your doctor and make sure to take the medications as directed.  If you have any questions about medications, ask your doctor.    Diet:  Resume your normal diet as much as possible, but do not eat fried, fatty or spicy foods while you have pain.  Do not drink alcohol or have caffeine.  Do not smoke tobacco.    Probiotics: If you have been given an antibiotic, you may want to also take a probiotic pill or eat yogurt with live cultures. Probiotics have \"good bacteria\" to help your intestines stay healthy. Studies have shown that probiotics help prevent diarrhea and other intestine problems (including C. diff infection) when you take antibiotics. You can buy these without a prescription in the pharmacy section of the store.     If you were given a prescription for medicine here today, be sure to read all of the information (including the package insert) that comes with your prescription.  This will include important information about the medicine, its side effects, and any warnings that you need to know about.  The pharmacist who fills the prescription can provide more information " and answer questions you may have about the medicine.  If you have questions or concerns that the pharmacist cannot address, please call or return to the Emergency Department.           24 Hour Appointment Hotline       To make an appointment at any [facility] clinic, call [phone] ([phone]). If you don't have a family doctor or clinic, we will help you find one. [facility] clinics are conveniently located to serve the needs of you and your family.             Review of your medicines      START taking        Dose / Directions Last dose taken    ciprofloxacin 500 MG tablet   Commonly known as:  CIPRO   Dose:  500 mg   Quantity:  14 tablet        Take 1 tablet (500 mg) by mouth 2 times daily for 7 days   Refills:  0          Our records show that you are taking the medicines listed below. If these are incorrect, please call your family doctor or clinic.        Dose / Directions Last dose taken    ADVIL 200 MG capsule   Dose:  200 mg   Generic drug:  ibuprofen        Take 200 mg by mouth as needed.   Refills:  0        B-12 PO        Refills:  0        BLISOVI FE 1.5/30 1.5-30 MG-MCG per tablet   Generic drug:  norethindrone-ethinyl estradiol-iron        TAKE 1 TABLET BY MOUTH ONCE DAILY.   Refills:  2        LANTUS SC        Inject  Subcutaneous daily. 8 units every am   Refills:  0        MICROGESTIN 1.5-30 MG-MCG per tablet   Dose:  1 tablet   Generic drug:  norethindrone-ethinyl estradiol        Take 1 tablet by mouth daily Reported on 5/8/2017   Refills:  0        NOVOLIN L SC        Inject Subcutaneous as needed Reported on 2/13/2017   Refills:  0        propranolol 10 MG tablet   Commonly known as:  INDERAL        Take  by mouth as needed.   Refills:  0        STATIN NOT PRESCRIBED (INTENTIONAL)   Quantity:  0 each        Statin not prescribed intentionally due to does not meet [place] criteria   Refills:  0        VALTREX 500 MG tablet   Dose:  500 mg   Generic drug:  valACYclovir        Take 500  mg by mouth daily.   Refills:  0          STOP taking        Dose Reason for stopping Comments    nitrofurantoin (macrocrystal-monohydrate) 100 MG capsule   Commonly known as:  MACROBID                      Prescriptions were sent or printed at these locations (1 Prescription)                   [id number] - [place], [place] - [address]   [address] [facility], [facility] [address]    Telephone:  [phone]   Fax:  [phone]   Hours:                  E-Prescribed (1 of 1)         ciprofloxacin (CIPRO) 500 MG tablet                Procedures and tests performed during your visit     Abd/pelvis CT,  IV  contrast only TRAUMA / AAA    Abdomen US, limited (RUQ only)    Blood culture ONE site    CBC with platelets differential    Comprehensive metabolic panel    HCG qualitative urine    Lactic acid    Lipase    Peripheral IV catheter    UA with Microscopic      Orders Needing Specimen Collection     Ordered          05/10/17 0526  Enteric Bacteria and Virus Panel by PETER Stool - STAT, Prio: STAT, Needs to be Collected     Scheduled Task Status   05/10/17 0527 Collect Enteric Bacteria and Virus Panel by PETER Stool Open   Order Class:  PCU Collect                05/10/17 0552  Clostridium difficile toxin B PCR - STAT, Prio: STAT, Needs to be Collected     Scheduled Task Status   05/10/17 0553 Collect Clostridium difficile toxin B PCR Open   Order Class:  PCU Collect                  Pending Results     Date and Time Order Name Status Description    5/10/2017 0143 Blood culture ONE site In process     5/8/2017 1931 URINE CULTURE AEROBIC BACTERIAL In process             Pending Culture Results     Date and Time Order Name Status Description    5/10/2017 0143 Blood culture ONE site In process     5/8/2017 1931 URINE CULTURE AEROBIC BACTERIAL In process             Pending Results Instructions     If you had any lab results that were not finalized at the time of your Discharge, you can call the ED Lab Result RN at  [phone]. You will be contacted by this team for any positive Lab results or changes in treatment. The nurses are available 7 days a week from 10A to 6:30P.  You can leave a message 24 hours per day and they will return your call.        Test Results From Your Hospital Stay        5/10/2017  1:58 AM      Component Results     Component Value Ref Range & Units Status    HCG Qual Urine Negative NEG Final         5/10/2017  1:58 AM      Component Results     Component Value Ref Range & Units Status    Color Urine Light Yellow  Final    Appearance Urine Clear  Final    Glucose Urine Negative NEG mg/dL Final    Bilirubin Urine Negative NEG Final    Ketones Urine Negative NEG mg/dL Final    Specific Gravity Urine 1.002 (L) 1.003 - 1.035 Final    Blood Urine Negative NEG Final    pH Urine 6.0 5.0 - 7.0 pH Final    Protein Albumin Urine Negative NEG mg/dL Final    Urobilinogen mg/dL Normal 0.0 - 2.0 mg/dL Final    Nitrite Urine Negative NEG Final    Leukocyte Esterase Urine Negative NEG Final    Source Midstream Urine  Final    WBC Urine <1 0 - 2 /HPF Final    RBC Urine 0 0 - 2 /HPF Final    Bacteria Urine Few (A) NEG /HPF Final    Squamous Epithelial /HPF Urine <1 0 - 1 /HPF Final    Mucous Urine Present (A) NEG /LPF Final         5/10/2017  1:13 AM      Component Results     Component Value Ref Range & Units Status    WBC 3.3 (L) 4.0 - 11.0 10e9/L Final    RBC Count 5.05 3.8 - 5.2 10e12/L Final    Hemoglobin 14.9 11.7 - 15.7 g/dL Final    Hematocrit 42.9 35.0 - 47.0 % Final    MCV 85 78 - 100 fl Final    MCH 29.5 26.5 - 33.0 pg Final    MCHC 34.7 31.5 - 36.5 g/dL Final    RDW 12.5 10.0 - 15.0 % Final    Platelet Count 126 (L) 150 - 450 10e9/L Final    Diff Method Automated Method  Final    % Neutrophils 66.7 % Final    % Lymphocytes 23.9 % Final    % Monocytes 7.9 % Final    % Eosinophils 0.6 % Final    % Basophils 0.6 % Final    % Immature Granulocytes 0.3 % Final    Absolute Neutrophil 2.2 1.6 - 8.3 10e9/L Final     Absolute Lymphocytes 0.8 0.8 - 5.3 10e9/L Final    Absolute Monocytes 0.3 0.0 - 1.3 10e9/L Final    Absolute Eosinophils 0.0 0.0 - 0.7 10e9/L Final    Absolute Basophils 0.0 0.0 - 0.2 10e9/L Final    Abs Immature Granulocytes 0.0 0 - 0.4 10e9/L Final         5/10/2017  1:30 AM      Component Results     Component Value Ref Range & Units Status    Sodium 138 133 - 144 mmol/L Final    Potassium 3.7 3.4 - 5.3 mmol/L Final    Chloride 105 94 - 109 mmol/L Final    Carbon Dioxide 24 20 - 32 mmol/L Final    Anion Gap 9 3 - 14 mmol/L Final    Glucose 141 (H) 70 - 99 mg/dL Final    Urea Nitrogen 8 7 - 30 mg/dL Final    Creatinine 0.71 0.52 - 1.04 mg/dL Final    GFR Estimate >90  Non  GFR Calc   >60 mL/min/1.7m2 Final    GFR Estimate If Black >90   GFR Calc   >60 mL/min/1.7m2 Final    Calcium 8.1 (L) 8.5 - 10.1 mg/dL Final    Bilirubin Total 0.3 0.2 - 1.3 mg/dL Final    Albumin 3.7 3.4 - 5.0 g/dL Final    Protein Total 7.2 6.8 - 8.8 g/dL Final    Alkaline Phosphatase 46 40 - 150 U/L Final    ALT 26 0 - 50 U/L Final    AST 22 0 - 45 U/L Final         5/10/2017  1:28 AM      Component Results     Component Value Ref Range & Units Status    Lipase 97 73 - 393 U/L Final         5/10/2017  1:59 AM      Component Results     Component Value Ref Range & Units Status    Lactic Acid 0.8 0.4 - 2.0 mmol/L Final         5/10/2017  3:45 AM         5/10/2017  4:03 AM      Narrative     ULTRASOUND ABDOMEN LIMITED RIGHT UPPER QUADRANT  5/10/2017 3:44 AM     HISTORY: Epigastric pain and fever.    COMPARISON: None.    FINDINGS: Normal hepatic echogenicity. No hepatic masses. The  gallbladder is unremarkable without gallstones, wall thickening or  pericholecystic fluid. No focal tenderness over the gallbladder. No  intra- or extrahepatic biliary dilatation. The common duct measures  0.5 cm in diameter. The right kidney has normal size and echogenicity  measuring 10.2 cm in length. No right intrarenal collecting  system  dilatation, calculi or masses. No free fluid in the upper right  hemiabdomen.        Impression     IMPRESSION:  1. Unremarkable appearance of the gallbladder and liver.  2. No biliary dilatation.    CHA RAPHAEL MD         5/10/2017  5:16 AM      Narrative     CT ABDOMEN AND PELVIS WITH CONTRAST  5/10/2017 4:40 AM     HISTORY: Severe epigastric pain and fever.    COMPARISON: None.    TECHNIQUE: Following the uneventful administration of 81 mL Isovue-370  intravenous contrast, helical sections were acquired from the top of  the diaphragm through the pubic symphysis. Coronal reconstructions  were generated. Radiation dose for this scan was reduced using  automated exposure control, adjustment of the mA and/or kV according  to the patient's size, or iterative reconstruction technique.    FINDINGS:  Abdomen: The liver, spleen, pancreas, adrenal glands and kidneys are  unremarkable. The gallbladder is present. No enlarged lymph nodes or  free fluid in the upper abdomen.    Scan through the lower chest is unremarkable.    Pelvis: The small and large bowel are normal in caliber. The appendix  is not visualized. Apparent mild wall thickening of the transverse  colon. No pneumatosis or free intraperitoneal gas. The uterus is  present. No enlarged lymph nodes in the pelvis. A trace amount of free  fluid in the pelvis.        Impression     IMPRESSION:  1. Apparent mild wall thickening of the transverse colon, equivocal  for infectious or inflammatory colitis.  2. A trace amount of nonspecific free fluid in the pelvis  3. No other cause of acute pain identified in the abdomen or pelvis.    CHA RAPHAEL MD                Clinical Quality Measure: Blood Pressure Screening     Your blood pressure was checked while you were in the emergency department today. The last reading we obtained was  BP: 124/82 . Please read the guidelines below about what these numbers mean and what you should do about them.  If your  systolic blood pressure (the top number) is less than 120 and your diastolic blood pressure (the bottom number) is less than 80, then your blood pressure is normal. There is nothing more that you need to do about it.  If your systolic blood pressure (the top number) is 120-139 or your diastolic blood pressure (the bottom number) is 80-89, your blood pressure may be higher than it should be. You should have your blood pressure rechecked within a year by a primary care provider.  If your systolic blood pressure (the top number) is 140 or greater or your diastolic blood pressure (the bottom number) is 90 or greater, you may have high blood pressure. High blood pressure is treatable, but if left untreated over time it can put you at risk for heart attack, stroke, or kidney failure. You should have your blood pressure rechecked by a primary care provider within the next 4 weeks.  If your provider in the emergency department today gave you specific instructions to follow-up with your doctor or provider even sooner than that, you should follow that instruction and not wait for up to 4 weeks for your follow-up visit.        Thank you for choosing [facility]       Thank you for choosing [facility] for your care. Our goal is always to provide you with excellent care. Hearing back from our patients is one way we can continue to improve our services. Please take a few minutes to complete the written survey that you may receive in the mail after you visit with us. Thank you!        [facility] Information     [facility] gives you secure access to your electronic health record. If you see a primary care provider, you can also send messages to your care team and make appointments. If you have questions, please call your primary care clinic.  If you do not have a primary care provider, please call [phone] and they will assist you.        Care EveryWhere ID     This is your Care EveryWhere ID. This could be used by other organizations to  access your [facility] medical records  [id number]        After Visit Summary       This is your record. Keep this with you and show to your community pharmacist(s) and doctor(s) at your next visit.

## 2017-05-10 NOTE — DISCHARGE INSTRUCTIONS
Discharge Instructions  Abdominal Pain    Abdominal pain can be caused by many things. Your evaluation today does not show the exact cause for your pain. Your doctor today has decided that it is unlikely your pain is due to a life threatening problem, or a problem requiring surgery or hospital admission. Sometimes those problems cannot be found right away, so it is very important that you follow up as directed.  Sometimes only the changes which occur over time allow the cause of your pain to be found.    Return to the Emergency Department for a recheck in 8-12 hours if your pain continues.  If your pain gets worse, changes in location, or feels different, return to the Emergency Department right away.    ADULTS:  Return to the Emergency Department right away if:      You get an oral temperature above 102oF or as directed by your doctor.    You have blood in your stools (bright red or black, tarry stools).    You keep throwing up or can t drink liquids.    You see blood when you throw up.    You can t have a bowel movement or you can t pass gas.    Your stomach gets bloated or bigger.    Your skin or the whites of your eyes look yellow.    You faint.    You have bloody, frequent or painful urination.    You have new symptoms or anything that worries you.    CHILDREN:  Return to the Emergency Department right away if your child has any of the above-listed symptoms or the following:      Pushes your hand away or screams/cries when his/her belly is touched.    You notice your child is very fussy or weak.    Your child is very tired and is too tired to eat or drink.    Your child is dehydrated.  Signs of dehydration can be:  o Your infant has had no wet diapers in 4-5 hours.  o Your older child has not passed urine in 6-8 hours.  o Your infant or child starts to have dry mouth and lips, or no saliva or tears.    PREGNANT WOMEN:  Return to the Emergency Department right away if you have any of the above-listed symptoms or  the following:      You have bleeding, leaking fluid or passing tissue from the vagina.    You have worse pain or cramping, or pain in your shoulder or back.    You have vomiting that will not stop.    You have painful or bloody urination.    You have a temperature of 100oF or more.    Your baby is not moving as much as usual.    You faint.    You get a bad headache with or without eye problems and abdominal pain.    You have a convulsion or seizure.    You have unusual discharge from your vagina and abdominal pain.    Abdominal pain is pretty common during pregnancy.  Your pain may or may not be related to your pregnancy. You should follow-up closely with your OB doctor so they can evaluate you and your baby.  Until you follow-up with your regular doctor, do the following:       Avoid sex and do not put anything in your vagina.    Drink clear fluids.    Only take medications approved by your doctor.    MORE INFORMATION:    Appendicitis:  A possible cause of abdominal pain in any person who still has their appendix is acute appendicitis. Appendicitis is often hard to diagnose.  Testing does not always rule out early appendicitis or other causes of abdominal pain. Close follow-up with your doctor and re-evaluations may be needed to figure out the reason for your abdominal pain.    Follow-up:  It is very important that you make an appointment with your clinic and go to the appointment.  If you do not follow-up with your primary doctor, it may result in missing an important development which could result in permanent injury or disability and/or lasting pain.  If there is any problem keeping your appointment, call your doctor or return to the Emergency Department.    Medications:  Take your medications as directed by your doctor today.  Before using over-the-counter medications, ask your doctor and make sure to take the medications as directed.  If you have any questions about medications, ask your doctor.    Diet:   "Resume your normal diet as much as possible, but do not eat fried, fatty or spicy foods while you have pain.  Do not drink alcohol or have caffeine.  Do not smoke tobacco.    Probiotics: If you have been given an antibiotic, you may want to also take a probiotic pill or eat yogurt with live cultures. Probiotics have \"good bacteria\" to help your intestines stay healthy. Studies have shown that probiotics help prevent diarrhea and other intestine problems (including C. diff infection) when you take antibiotics. You can buy these without a prescription in the pharmacy section of the store.     If you were given a prescription for medicine here today, be sure to read all of the information (including the package insert) that comes with your prescription.  This will include important information about the medicine, its side effects, and any warnings that you need to know about.  The pharmacist who fills the prescription can provide more information and answer questions you may have about the medicine.  If you have questions or concerns that the pharmacist cannot address, please call or return to the Emergency Department.         "

## 2017-05-11 ENCOUNTER — HOSPITAL ENCOUNTER (OUTPATIENT)
Dept: LAB | Facility: CLINIC | Age: 32
Discharge: HOME | End: 2017-05-11
Attending: EMERGENCY MEDICINE | Admitting: EMERGENCY MEDICINE
Payer: COMMERCIAL

## 2017-05-11 DIAGNOSIS — R10.13 ABDOMINAL PAIN, EPIGASTRIC: Primary | ICD-10-CM

## 2017-05-11 LAB
C DIFF TOX B STL QL: NORMAL
CAMPYLOBACTER GROUP BY NAT: NOT DETECTED
ENTERIC PATHOGEN COMMENT: ABNORMAL
NOROVIRUS I AND II BY NAT: NOT DETECTED
ROTAVIRUS A BY NAT: ABNORMAL
SALMONELLA SPECIES BY NAT: NOT DETECTED
SHIGA TOXIN 1 GENE BY NAT: NOT DETECTED
SHIGA TOXIN 2 GENE BY NAT: NOT DETECTED
SHIGELLA SP+EIEC IPAH STL QL NAA+PROBE: NOT DETECTED
SPECIMEN SOURCE: NORMAL
VIBRIO GROUP BY NAT: NOT DETECTED
YERSINIA ENTEROCOLITICA BY NAT: NOT DETECTED

## 2017-05-11 PROCEDURE — 87506 IADNA-DNA/RNA PROBE TQ 6-11: CPT | Performed by: EMERGENCY MEDICINE

## 2017-05-11 PROCEDURE — 87493 C DIFF AMPLIFIED PROBE: CPT | Mod: XU | Performed by: EMERGENCY MEDICINE

## 2017-05-12 NOTE — ED NOTES
Called patient and left message regarding positive rotavirus result in stool.  Recommended discontinuing cipro; number left for call back if questions.     Fanny Chong MD  05/12/17 2698

## 2017-05-16 LAB
BACTERIA SPEC CULT: NO GROWTH
Lab: NORMAL
MICRO REPORT STATUS: NORMAL
SPECIMEN SOURCE: NORMAL

## 2017-06-03 ENCOUNTER — HEALTH MAINTENANCE LETTER (OUTPATIENT)
Age: 32
End: 2017-06-03

## 2017-07-12 ENCOUNTER — TRANSFERRED RECORDS (OUTPATIENT)
Dept: FAMILY MEDICINE | Facility: CLINIC | Age: 32
End: 2017-07-12

## 2018-04-20 ENCOUNTER — TELEPHONE (OUTPATIENT)
Dept: OTHER | Facility: CLINIC | Age: 33
End: 2018-04-20

## 2019-09-20 ENCOUNTER — HOSPITAL ENCOUNTER (OUTPATIENT)
Facility: CLINIC | Age: 34
End: 2019-09-20
Attending: COLON & RECTAL SURGERY | Admitting: COLON & RECTAL SURGERY

## 2019-09-20 ENCOUNTER — TRANSFERRED RECORDS (OUTPATIENT)
Dept: FAMILY MEDICINE | Facility: CLINIC | Age: 34
End: 2019-09-20

## 2019-09-26 RX ORDER — ONDANSETRON 2 MG/ML
4 INJECTION INTRAMUSCULAR; INTRAVENOUS
Status: CANCELLED | OUTPATIENT
Start: 2019-09-26

## 2019-09-26 RX ORDER — LIDOCAINE 40 MG/G
CREAM TOPICAL
Status: CANCELLED | OUTPATIENT
Start: 2019-09-26

## 2019-10-31 ENCOUNTER — OFFICE VISIT (OUTPATIENT)
Dept: FAMILY MEDICINE | Facility: CLINIC | Age: 34
End: 2019-10-31

## 2019-10-31 VITALS
DIASTOLIC BLOOD PRESSURE: 72 MMHG | BODY MASS INDEX: 23.65 KG/M2 | SYSTOLIC BLOOD PRESSURE: 104 MMHG | OXYGEN SATURATION: 99 % | WEIGHT: 151 LBS | HEART RATE: 76 BPM | RESPIRATION RATE: 16 BRPM

## 2019-10-31 DIAGNOSIS — F41.8 PERFORMANCE ANXIETY: Primary | ICD-10-CM

## 2019-10-31 DIAGNOSIS — Z23 NEED FOR VACCINATION: ICD-10-CM

## 2019-10-31 PROCEDURE — 90472 IMMUNIZATION ADMIN EACH ADD: CPT | Performed by: FAMILY MEDICINE

## 2019-10-31 PROCEDURE — 90686 IIV4 VACC NO PRSV 0.5 ML IM: CPT | Performed by: FAMILY MEDICINE

## 2019-10-31 PROCEDURE — 90746 HEPB VACCINE 3 DOSE ADULT IM: CPT | Performed by: FAMILY MEDICINE

## 2019-10-31 PROCEDURE — 99214 OFFICE O/P EST MOD 30 MIN: CPT | Mod: 25 | Performed by: FAMILY MEDICINE

## 2019-10-31 PROCEDURE — 90471 IMMUNIZATION ADMIN: CPT | Performed by: FAMILY MEDICINE

## 2019-10-31 RX ORDER — PROPRANOLOL HYDROCHLORIDE 10 MG/1
10 TABLET ORAL PRN
Qty: 30 TABLET | Refills: 0 | Status: SHIPPED | OUTPATIENT
Start: 2019-10-31 | End: 2019-11-24

## 2019-10-31 NOTE — PROGRESS NOTES
Problem(s) Oriented visit        SUBJECTIVE:                                                    Tala Enciso is a 34 year old female who presents to clinic today for the following health issues :    1. Performance anxiety  Needs a refill  - propranolol (INDERAL) 10 MG tablet; Take 1 tablet (10 mg) by mouth as needed  Dispense: 30 tablet; Refill: 0    2. Need for vaccination  Going to work at [employer].  - ADMIN 1st VACCINE  - INFLUENZA VACCINE IM 4YRS+ 4 VALENT CCIIV4  - TDAP, IM (10 - 64 YRS) - Adacel; Future  - VARICELLA, LIVE, SUBQ (12+ MO); Future         Problem list, Medication list, Allergies, and Medical/Social/Surgical histories reviewed in [facility] and updated as appropriate.   Additional history: as documented    ROS:  General and Resp. 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        OBJECTIVE:                                                    /72   Pulse 76   Resp 16   Wt 68.5 kg (151 lb)   SpO2 99%   BMI 23.65 kg/m    Body mass index is 23.65 kg/m .   APPEARANCE: = Relaxed and in no distress  Conj/Eyelids = noninjected and lids and lashes are without inflammation  PERRLA/Irises = Pupils Round Reactive to Light and Irisis without inflammation  Neck = No anterior or posterior adenopathy appreciated.  Thyroid = Not enlarged and no masses felt  Resp effort = Calm regular breathing  Breath Sounds = Good air movement with no rales or rhonchi in any lung  fields  Heart Rate, Rhythm, & sounds (no Murm)  = Regular rate and rhythm with no S3, S4, or murmur appreciated.  Carotid Art's = Pulses full and equal and no bruits appreciated  Abdomen = Soft, nontender, no masses, & bowel sounds in all quadrants  Liver/Spleen = Normal span and no splenomegaly noted  Digits and Nails = FROM in all finger joints, no nail dystrophy  Ext (edema) = No pretibial edema noted or elsewhere  Musculsktl =  Strength and ROM of major joints are within normal limits  SKIN = absent significant rashes or lesions   Recent/Remote Memory = Alert and Oriented x 3  Mood/Affect = Cooperative and interested     ASSESSMENT/PLAN:                                                        Tala was seen today for consult.    Diagnoses and all orders for this visit:    Performance anxiety  -     propranolol (INDERAL) 10 MG tablet; Take 1 tablet (10 mg) by mouth as needed    Need for vaccination  -     ADMIN 1st VACCINE  -     INFLUENZA VACCINE IM 4YRS+ 4 VALENT CCIIV4  -     TDAP, IM (10 - 64 YRS) - Adacel; Future  -     VARICELLA, LIVE, SUBQ (12+ MO); Future    Other orders  -     HEPATITIS B VACCINE, ADULT, IM      >25 min spent with patient, greater than 50% spent on discussion/education/planning, etc. About The primary encounter diagnosis was Performance anxiety. A diagnosis of Need for vaccination was also pertinent to this visit.      Work on weight loss    The following health maintenance items are reviewed in Epic and correct as of today:  Health Maintenance   Topic Date Due     PREVENTIVE CARE VISIT  1985     DIABETIC FOOT EXAM  1985     HIV SCREENING  02/17/1998     PNEUMOCOCCAL IMMUNIZATION 19-64 MEDIUM RISK (1 of 1 - PPSV23) 02/17/2002     HPV  02/17/2004     PAP  02/17/2008     DTAP/TDAP/TD IMMUNIZATION (1 - Tdap) 02/17/2008     A1C  09/22/2017     LIPID  03/22/2018     MICROALBUMIN  03/22/2018     BMP  05/10/2018     EYE EXAM  07/12/2018     PHQ-2  01/01/2019     TSH W/FREE T4  REFLEX  03/22/2019     INFLUENZA VACCINE (1) 09/01/2019     IPV IMMUNIZATION  Aged Out     MENINGITIS IMMUNIZATION  Aged Out       Daneil Watson MD  [facility]  [phone]    For any issues my office # is [phone]

## 2019-11-01 ENCOUNTER — TRANSFERRED RECORDS (OUTPATIENT)
Dept: FAMILY MEDICINE | Facility: CLINIC | Age: 34
End: 2019-11-01

## 2019-11-01 ENCOUNTER — TRANSFERRED RECORDS (OUTPATIENT)
Dept: HEALTH INFORMATION MANAGEMENT | Facility: CLINIC | Age: 34
End: 2019-11-01

## 2019-11-06 LAB
QUANTIFERON CRITERIA: NORMAL
QUANTIFERON INCUBATION: NORMAL
QUANTIFERON MITOGEN VALUE: >10 IU/ML
QUANTIFERON NIL VALUE: 0.09 IU/ML
QUANTIFERON TB GOLD PLUS: NEGATIVE
QUANTIFERON TB1 AG VALUE: 0.07 IU/ML
QUANTIFERON TB2 AG VALUE: 0.07 IU/ML

## 2019-11-24 DIAGNOSIS — F41.8 PERFORMANCE ANXIETY: ICD-10-CM

## 2019-11-24 RX ORDER — PROPRANOLOL HYDROCHLORIDE 10 MG/1
10 TABLET ORAL PRN
Qty: 30 TABLET | Refills: 0 | Status: SHIPPED | OUTPATIENT
Start: 2019-11-24

## 2019-12-09 ENCOUNTER — HEALTH MAINTENANCE LETTER (OUTPATIENT)
Age: 34
End: 2019-12-09

## 2019-12-18 PROCEDURE — 90471 IMMUNIZATION ADMIN: CPT | Performed by: FAMILY MEDICINE

## 2019-12-18 PROCEDURE — 90746 HEPB VACCINE 3 DOSE ADULT IM: CPT | Performed by: FAMILY MEDICINE

## 2019-12-18 NOTE — LETTER
[facility] [id number] [place] [id number] [phone]      2019      Tala Enciso  [facility] [facility] [address]   1985            Dear To Whom it May concern;    The above patient is allergic to the tetanus vaccine with anaphylaxis reactions.  It is highly advised NOT TO receive this vaccine.      Sincerely,    Daniel Watson M.D.

## 2019-12-18 NOTE — PROGRESS NOTES
Letter printed for Walter to sign upon return to clinic regarding allergic reaction to tetanus for patient's work program  Rylie Magallon MA December 18, 2019 8:29 AM

## 2020-03-15 ENCOUNTER — HEALTH MAINTENANCE LETTER (OUTPATIENT)
Age: 35
End: 2020-03-15

## 2020-06-24 PROCEDURE — 90746 HEPB VACCINE 3 DOSE ADULT IM: CPT | Performed by: FAMILY MEDICINE

## 2020-06-24 PROCEDURE — 90471 IMMUNIZATION ADMIN: CPT | Performed by: FAMILY MEDICINE

## 2020-09-10 ENCOUNTER — TRANSFERRED RECORDS (OUTPATIENT)
Dept: HEALTH INFORMATION MANAGEMENT | Facility: CLINIC | Age: 35
End: 2020-09-10

## 2020-09-10 LAB
CHOLEST SERPL-MCNC: 137 MG/DL
CREAT SERPL-MCNC: 0.69 MG/DL (ref 0.5–1.1)
GFR SERPL CREATININE-BSD FRML MDRD: 111 ML/MIN/1.73M2
GLUCOSE SERPL-MCNC: 113 MG/DL (ref 65–99)
HBA1C MFR BLD: 6.1 % (ref 4–6)
HDLC SERPL-MCNC: 46 MG/DL
LDLC SERPL CALC-MCNC: 75 MG/DL
NONHDLC SERPL-MCNC: 91 MG/DL
POTASSIUM SERPL-SCNC: 4.2 MMOL/L (ref 3.5–5.3)
TRIGL SERPL-MCNC: 82 MG/DL
TSH SERPL-ACNC: 1.98 UIU/ML (ref 0.3–5)

## 2020-10-08 ENCOUNTER — MEDICAL CORRESPONDENCE (OUTPATIENT)
Dept: HEALTH INFORMATION MANAGEMENT | Facility: CLINIC | Age: 35
End: 2020-10-08

## 2020-10-08 ENCOUNTER — TRANSFERRED RECORDS (OUTPATIENT)
Dept: HEALTH INFORMATION MANAGEMENT | Facility: CLINIC | Age: 35
End: 2020-10-08

## 2020-10-08 ENCOUNTER — TRANSCRIBE ORDERS (OUTPATIENT)
Dept: MATERNAL FETAL MEDICINE | Facility: CLINIC | Age: 35
End: 2020-10-08

## 2020-10-08 DIAGNOSIS — O26.90 PREGNANCY RELATED CONDITION: Primary | ICD-10-CM

## 2020-10-19 ENCOUNTER — TRANSFERRED RECORDS (OUTPATIENT)
Dept: HEALTH INFORMATION MANAGEMENT | Facility: CLINIC | Age: 35
End: 2020-10-19

## 2020-10-28 ENCOUNTER — HOSPITAL ENCOUNTER (OUTPATIENT)
Facility: CLINIC | Age: 35
Discharge: HOME | End: 2020-10-28
Attending: OBSTETRICS & GYNECOLOGY | Admitting: OBSTETRICS & GYNECOLOGY
Payer: COMMERCIAL

## 2020-11-02 ENCOUNTER — TELEPHONE (OUTPATIENT)
Dept: MATERNAL FETAL MEDICINE | Facility: CLINIC | Age: 35
End: 2020-11-02

## 2020-11-02 NOTE — TELEPHONE ENCOUNTER
"Called Tala to discuss ALLAN for endo records r/t DM care. Will email ALLAN to [email or web address] Pt states she has been diagnosed with \"type 1 1/2\" or late onset type 1 DM, feels her endo care has been very well managed and is w/o questions pertaining to DM care/pregnancy. Primarily concerned with age-related risks to pregnancy, has GC/NT and L2 scheduled already.  "

## 2020-11-03 ENCOUNTER — DOCUMENTATION ONLY (OUTPATIENT)
Dept: MATERNAL FETAL MEDICINE | Facility: CLINIC | Age: 35
End: 2020-11-03

## 2020-11-03 ENCOUNTER — TRANSCRIBE ORDERS (OUTPATIENT)
Dept: MATERNAL FETAL MEDICINE | Facility: CLINIC | Age: 35
End: 2020-11-03

## 2020-11-03 DIAGNOSIS — O26.90 PREGNANCY RELATED CONDITION: Primary | ICD-10-CM

## 2020-11-03 NOTE — PROGRESS NOTES
Patient scheduled for 1st tri screen on 11/6/20 and request was sent for MFM consult. Reviewed with Dr. Gonsales. Patient should have consult on Friday or it can be done with L2. Dr. Morel agreed to see patient prior to GC/1st trimester screen. Sharon BLACK () will call patient to schedule.    Signed ALLAN received from patient for [facility]. ALLAN faxed. VM left with medical records at clinic to confirm they received ALLAN (also noted urgent request as patient has appointment on Friday).    Libby Gallo RN

## 2020-11-04 ENCOUNTER — PRE VISIT (OUTPATIENT)
Dept: MATERNAL FETAL MEDICINE | Facility: CLINIC | Age: 35
End: 2020-11-04

## 2020-11-06 ENCOUNTER — OFFICE VISIT (OUTPATIENT)
Dept: MATERNAL FETAL MEDICINE | Facility: CLINIC | Age: 35
End: 2020-11-06
Attending: OBSTETRICS & GYNECOLOGY
Payer: COMMERCIAL

## 2020-11-06 ENCOUNTER — HOSPITAL ENCOUNTER (OUTPATIENT)
Dept: ULTRASOUND IMAGING | Facility: CLINIC | Age: 35
End: 2020-11-06
Attending: OBSTETRICS & GYNECOLOGY
Payer: COMMERCIAL

## 2020-11-06 DIAGNOSIS — O26.90 PREGNANCY RELATED CONDITION: ICD-10-CM

## 2020-11-06 DIAGNOSIS — O09.521 MULTIGRAVIDA OF ADVANCED MATERNAL AGE IN FIRST TRIMESTER: Primary | ICD-10-CM

## 2020-11-06 DIAGNOSIS — O24.911 DIABETES MELLITUS AFFECTING PREGNANCY IN FIRST TRIMESTER: Primary | ICD-10-CM

## 2020-11-06 PROCEDURE — 76813 OB US NUCHAL MEAS 1 GEST: CPT

## 2020-11-06 PROCEDURE — 96040 HC GENETIC COUNSELING, EACH 30 MINUTES: CPT | Performed by: GENETIC COUNSELOR, MS

## 2020-11-06 PROCEDURE — 76813 OB US NUCHAL MEAS 1 GEST: CPT | Mod: 26 | Performed by: OBSTETRICS & GYNECOLOGY

## 2020-11-06 PROCEDURE — 99205 OFFICE O/P NEW HI 60 MIN: CPT | Mod: 25 | Performed by: OBSTETRICS & GYNECOLOGY

## 2020-11-06 NOTE — PROGRESS NOTES
[facility]  Genetic Counseling Consult    Patient: Tala Enciso YOB: 1985   Date of Service: 20      Tala Enciso was seen at [facility] [place] for genetic consultation to discuss the options for screening and testing for fetal chromosome abnormalities.  The indication for genetic counseling is advanced maternal age and maternal diabetes. The patient was unaccompanied to today's visit.        Impression/Plan:   1.  Tala had an NT ultrasound and [facility] consultation today. Please see corresponding reports for further details. She underwent Panorama NIPT and Horizon carrier screening with her primary OB. Confirmed with [facility] laboratory, results are still pending. Diagnostic testing options will remain available to Tala. Maternal serum AFP (single marker screen) is recommended after 15 weeks to screen for open neural tube defects. A quad screen should not be performed.     3.  An 18-20 week comprehensive ultrasound is standard of care for all women 35 or older at delivery.    4. Fetal echocardiogram has been recommended given pre-exisiting diabetes.     Of note, Tala is moving to [place] and planning to transfer care to [facility] . She was provided with [facility] contact information today.     Pregnancy History:   /Parity:    Age at Delivery: 36 year old  SHIVA: 2021, by Last Menstrual Period  Gestational Age: 11w4d    Medical History:   Tala was identified to have latent autoimmune diabetes managed as type 1 diabetes and is taking insulin. We discussed that maternal diabetes increases the chance for birth defects especially when poorly controlled in the first trimester. These birth defects can include spinal cord defects (spina bifida), heart defects, skeletal defects, and defects in the urinary, reproductive, and digestive systems. Diabetes in the pregnancy can also lead to complications such as  pre-eclampsia, polyhydramnios, and  delivery. Level II comprehensive ultrasound and fetal echocardiogram are typically recommended. Tala has a history of concern for von willebrand disease and mis danlos syndrome that were noted by her pediatrician. She shared that there has been no evidence for these conditions as an adult and she may plan to pursue further workup at [facility]. We briefly reviewed autosomal dominant inheritance for these conditions and that if Tala is truly identified to have these, her children would be at a 50% risk and this should be shared with their pediatrician. Tala had a MFM consultation today, please see Dr. Morel's note for ongoing pregnancy recommendations.        Family History:   A three-generation pedigree was obtained, and is scanned under the  Media  tab.   The following significant findings were reported by Tala:    Tala's partner is 38 and healthy.     It was reported that Tala's mother was identified to have breast cancer at age 36. She also was noted to have a history of skin cancer. Tala's partner's maternal aunt was reported to have passed away from what she believes was colon cancer in her 50's. No other family history of cancer was reported. We discussed how most cancer seen in families occurs sporadically, but about 5-10% may be due to an underlying genetic etiology. Tala's mother underwent genetic testing for the BRCA genes which was reportedly negative. Tala has not undergone a mammogram. It is reassuring that Tala's mother was reportedly not identified to carry a BRCA variant, however Tala may still be at increased risk for breast cancer based on family history alone. We reviewed the importance of sharing this family history information with their primary care providers to ensure appropriate screening.     In addition to Tala's latent autoimmune diabetes, Tala's sister was reported to have several autoimmune conditions.  Autoimmune conditions are thought to be multifactorial, resulting from a combination of genetic and environmental factors. Once an autoimmune disease is present in a family, other relatives may be at increased risk to develop the same disease or a different autoimmune disease and should share this information with their primary care providers.   Tala reported a history of anxiety. She also noted her sister and maternal half sister were identified to have depression. We discussed how mental illnesses are thought to be inherited in a multifactorial fashion, meaning many factors are involved in the development including genetic and environmental aspects and a combination of these aspects lead to the condition. Given that there is a genetic component, other close relatives including the couple's children may be at increased chance to develop a mental illness and were encouraged to share this information with their pediatrician and have awareness for early signs and symptoms.     Otherwise, the reported family history is negative for multiple miscarriages, stillbirths, birth defects, intellectual disability, known genetic conditions, and consanguinity.       Carrier Screening:   The patient reports that she and the father of the pregnancy have  ancestry:     Cystic fibrosis is an autosomal recessive genetic condition that occurs with increased frequency in individuals of  ancestry and carrier screening for this condition is available.  In addition,  screening in the [facility] includes cystic fibrosis.      Expanded carrier screening for mutations in a large panel of genes associated with autosomal recessive conditions including cystic fibrosis, spinal muscular atrophy, and others, is now available.      Horizon carrier screening is currently pending with her primary OB.        Risk Assessment for Chromosome Conditions:   We explained that the risk for fetal chromosome abnormalities  increases with maternal age. We discussed specific features of common chromosome abnormalities, including Down syndrome, trisomy 13, trisomy 18, and sex chromosome trisomies.      - At age 36 at midtrimester, the risk to have a baby with Down syndrome is 1 in 129.     - At age 36 at midtrimester, the risk to have a baby with any chromosome abnormality is 1 in 65.       Tala elected to pursue Panorama NIPT with her primary OB in the current pregnancy. This is still pending.        Testing Options:   We discussed the following options:   Non-invasive Prenatal Testing (NIPT)    Maternal plasma cell-free DNA testing; first trimester ultrasound with nuchal translucency and nasal bone assessment is recommended, when appropriate    Screens for fetal trisomy 21, trisomy 13, trisomy 18, and sex chromosome aneuploidy    Cannot screen for open neural tube defects; maternal serum AFP after 15 weeks is recommended     Chorionic villus sampling (CVS)    Invasive procedure typically performed in the first trimester by which placental villi are obtained for the purpose of chromosome analysis and/or other prenatal genetic analysis    Diagnostic results; >99% sensitivity for fetal chromosome abnormalities    Cannot test for open neural tube defects; maternal serum AFP after 15 weeks is recommended     Genetic Amniocentesis    Invasive procedure typically performed in the second trimester by which amniotic fluid is obtained for the purpose of chromosome analysis and/or other prenatal genetic analysis    Diagnostic results; >99% sensitivity for fetal chromosome abnormalities    AFAFP measurement tests for open neural tube defects     Comprehensive (Level II) ultrasound: Detailed ultrasound performed between 18-22 weeks gestation to screen for major birth defects and markers for aneuploidy.      We reviewed the benefits and limitations of this testing.  Screening tests provide a risk assessment specific to the pregnancy for certain  fetal chromosome abnormalities, but cannot definitively diagnose or exclude a fetal chromosome abnormality.  Follow-up genetic counseling and consideration of diagnostic testing is recommended with any abnormal screening result.     Diagnostic tests carry inherent risks- including risk of miscarriage- that require careful consideration.  These tests can detect fetal chromosome abnormalities with greater than 99% certainty.  Results can be compromised by maternal cell contamination or mosaicism, and are limited by the resolution of cytogenetic G-banding technology.  There is no screening nor diagnostic test that can detect all forms of birth defects or mental disability.     It was a pleasure to be involved with [facility]. Face-to-face time of the meeting was 25 minutes. Genetic counseling student, Nancy Adkins was present and participated in today's visit.     Dahlia Castillo MS, [facility]  Maternal Fetal Medicine  [facility]  Ph: [phone]  [email or web address]

## 2021-01-14 ENCOUNTER — HEALTH MAINTENANCE LETTER (OUTPATIENT)
Age: 36
End: 2021-01-14

## 2021-03-14 ENCOUNTER — HEALTH MAINTENANCE LETTER (OUTPATIENT)
Age: 36
End: 2021-03-14

## 2021-10-23 ENCOUNTER — HEALTH MAINTENANCE LETTER (OUTPATIENT)
Age: 36
End: 2021-10-23

## 2022-02-12 ENCOUNTER — HEALTH MAINTENANCE LETTER (OUTPATIENT)
Age: 37
End: 2022-02-12

## 2022-06-04 ENCOUNTER — HEALTH MAINTENANCE LETTER (OUTPATIENT)
Age: 37
End: 2022-06-04

## 2022-10-10 ENCOUNTER — HEALTH MAINTENANCE LETTER (OUTPATIENT)
Age: 37
End: 2022-10-10

## 2023-03-25 ENCOUNTER — HEALTH MAINTENANCE LETTER (OUTPATIENT)
Age: 38
End: 2023-03-25

## 2023-10-29 ENCOUNTER — HEALTH MAINTENANCE LETTER (OUTPATIENT)
Age: 38
End: 2023-10-29

## 2024-03-17 ENCOUNTER — HEALTH MAINTENANCE LETTER (OUTPATIENT)
Age: 39
End: 2024-03-17